# Patient Record
(demographics unavailable — no encounter records)

---

## 2025-07-28 NOTE — ASSESSMENT
[FreeTextEntry1] : Impression: Right carpal tunnel syndrome.  This patient will be treated with formal physical therapy along with use of a wrist splint at night and a course of diclofenac with GI precautions.  She will return if she is not improving

## 2025-07-28 NOTE — PHYSICAL EXAM
[de-identified] : Examination today reveals normal motion to the cervical spine in all planes without spasm or tenderness she has good motion of the right shoulder elbow with no difficulty placing the extremity in space.  The right wrist and hand have full motion as well in all planes is no evidence of atrophy with good strength present.  She does have a mildly positive Tinel/Phalen's.  X-rays ordered and taken today AP lateral and carpal tunnel view right wrist reviewed interpreted by myself are normal

## 2025-07-28 NOTE — HISTORY OF PRESENT ILLNESS
[de-identified] : This 23-year-old healthy right-handed young lady is seen for evaluation of her right wrist.  She states she has had discomfort for 4 months without any obvious history of trauma precipitating event.  She states she does have numbness involving her fingers mainly the index and middle no she does not have night pain.  She denies any difficulty with use of the hand and positioning of the extremity in space.  No neck pain.  Past history is otherwise negative.